# Patient Record
Sex: FEMALE | Race: WHITE | Employment: FULL TIME | URBAN - METROPOLITAN AREA
[De-identification: names, ages, dates, MRNs, and addresses within clinical notes are randomized per-mention and may not be internally consistent; named-entity substitution may affect disease eponyms.]

---

## 2018-04-19 PROCEDURE — 77030031139 HC SUT VCRL2 J&J -A

## 2018-04-19 PROCEDURE — 99283 EMERGENCY DEPT VISIT LOW MDM: CPT

## 2018-04-19 PROCEDURE — 75810000294 HC INTERM/LAYERED WND RPR

## 2018-04-19 PROCEDURE — 77030018836 HC SOL IRR NACL ICUM -A

## 2018-04-20 ENCOUNTER — HOSPITAL ENCOUNTER (EMERGENCY)
Age: 38
Discharge: HOME | End: 2018-04-20
Attending: EMERGENCY MEDICINE
Payer: SUBSIDIZED

## 2018-04-20 VITALS
HEIGHT: 62 IN | SYSTOLIC BLOOD PRESSURE: 107 MMHG | WEIGHT: 150 LBS | HEART RATE: 95 BPM | OXYGEN SATURATION: 100 % | TEMPERATURE: 97.4 F | DIASTOLIC BLOOD PRESSURE: 70 MMHG | BODY MASS INDEX: 27.6 KG/M2 | RESPIRATION RATE: 18 BRPM

## 2018-04-20 DIAGNOSIS — S01.81XA FACIAL LACERATION, INITIAL ENCOUNTER: Primary | ICD-10-CM

## 2018-04-20 PROCEDURE — 77030018836 HC SOL IRR NACL ICUM -A

## 2018-04-20 PROCEDURE — 90471 IMMUNIZATION ADMIN: CPT

## 2018-04-20 PROCEDURE — 74011250636 HC RX REV CODE- 250/636: Performed by: PHYSICIAN ASSISTANT

## 2018-04-20 PROCEDURE — 75810000294 HC INTERM/LAYERED WND RPR

## 2018-04-20 PROCEDURE — 90715 TDAP VACCINE 7 YRS/> IM: CPT | Performed by: PHYSICIAN ASSISTANT

## 2018-04-20 PROCEDURE — 74011000250 HC RX REV CODE- 250: Performed by: PHYSICIAN ASSISTANT

## 2018-04-20 PROCEDURE — 77030031139 HC SUT VCRL2 J&J -A

## 2018-04-20 RX ORDER — LIDOCAINE HYDROCHLORIDE AND EPINEPHRINE 10; 10 MG/ML; UG/ML
1.5 INJECTION, SOLUTION INFILTRATION; PERINEURAL ONCE
Status: COMPLETED | OUTPATIENT
Start: 2018-04-20 | End: 2018-04-20

## 2018-04-20 RX ORDER — BACITRACIN 500 UNIT/G
1 PACKET (EA) TOPICAL
Status: COMPLETED | OUTPATIENT
Start: 2018-04-20 | End: 2018-04-20

## 2018-04-20 RX ADMIN — BACITRACIN 1 PACKET: 500 OINTMENT TOPICAL at 00:35

## 2018-04-20 RX ADMIN — LIDOCAINE HYDROCHLORIDE,EPINEPHRINE BITARTRATE 15 MG: 10; .01 INJECTION, SOLUTION INFILTRATION; PERINEURAL at 00:34

## 2018-04-20 RX ADMIN — TETANUS TOXOID, REDUCED DIPHTHERIA TOXOID AND ACELLULAR PERTUSSIS VACCINE, ADSORBED 0.5 ML: 5; 2.5; 8; 8; 2.5 SUSPENSION INTRAMUSCULAR at 00:55

## 2018-04-20 NOTE — ED TRIAGE NOTES
Patient presents to the emergency department reporting laceration the right eyebrow. Patient struck the corner of a car door. Patient unsure of last tetanus shot. Bleeding is controlled with direct pressure and ice.

## 2018-04-20 NOTE — DISCHARGE INSTRUCTIONS
Cuts Closed With Stitches: Care Instructions  Your Care Instructions  A cut can happen anywhere on your body. The doctor used stitches to close the cut. Using stitches also helps the cut heal and reduces scarring. Sometimes pieces of tape called Steri-Strips are put over the stitches. If the cut went deep and through the skin, the doctor may have put in two layers of stitches. The deeper layer brings the deep part of the cut together. These stitches will dissolve and don't need to be removed. The stitches in the upper layer are the ones you see on the cut. You will probably have a bandage over the stitches. You will need to have the stitches removed, usually in 7 to 14 days. The doctor has checked you carefully, but problems can develop later. If you notice any problems or new symptoms, get medical treatment right away. Follow-up care is a key part of your treatment and safety. Be sure to make and go to all appointments, and call your doctor if you are having problems. It's also a good idea to know your test results and keep a list of the medicines you take. How can you care for yourself at home? · Keep the cut dry for the first 24 to 48 hours. After this, you can shower if your doctor okays it. Pat the cut dry. · Don't soak the cut, such as in a bathtub. Your doctor will tell you when it's safe to get the cut wet. · If your doctor told you how to care for your cut, follow your doctor's instructions. If you did not get instructions, follow this general advice:  ¨ After the first 24 to 48 hours, wash around the cut with clean water 2 times a day. Don't use hydrogen peroxide or alcohol, which can slow healing. ¨ You may cover the cut with a thin layer of petroleum jelly, such as Vaseline, and a nonstick bandage. ¨ Apply more petroleum jelly and replace the bandage as needed. · Prop up the sore area on a pillow anytime you sit or lie down during the next 3 days.  Try to keep it above the level of your heart. This will help reduce swelling. · Avoid any activity that could cause your cut to reopen. · Do not remove the stitches on your own. Your doctor will tell you when to come back to have the stitches removed. · Leave Steri-Strips on until they fall off. · Be safe with medicines. Read and follow all instructions on the label. ¨ If the doctor gave you a prescription medicine for pain, take it as prescribed. ¨ If you are not taking a prescription pain medicine, ask your doctor if you can take an over-the-counter medicine. When should you call for help? Call your doctor now or seek immediate medical care if:  ? · You have new pain, or your pain gets worse. ? · The skin near the cut is cold or pale or changes color. ? · You have tingling, weakness, or numbness near the cut.   ? · The cut starts to bleed, and blood soaks through the bandage. Oozing small amounts of blood is normal.   ? · You have trouble moving the area near the cut.   ? · You have symptoms of infection, such as:  ¨ Increased pain, swelling, warmth, or redness around the cut. ¨ Red streaks leading from the cut. ¨ Pus draining from the cut. ¨ A fever. ? Watch closely for changes in your health, and be sure to contact your doctor if:  ? · The cut reopens. ? · You do not get better as expected. Where can you learn more? Go to http://breanna-bakari.info/. Enter R217 in the search box to learn more about \"Cuts Closed With Stitches: Care Instructions. \"  Current as of: March 20, 2017  Content Version: 11.4  © 2182-7512 "[employer]. Care instructions adapted under license by [facility] (which disclaims liability or warranty for this information). If you have questions about a medical condition or this instruction, always ask your healthcare professional. [address] any warranty or liability for your use of this information.

## 2018-04-20 NOTE — ED PROVIDER NOTES
HPI Comments: 41 yo female with no significant PMH Here for evaluation of laceration to right eyebrow. States she opened car door and misjudged it causing metal edge to cut eyebrow. Bleeding controlled. No additional symptoms. TD Unsure. Patient is a 40 y.o. female presenting with skin laceration. The history is provided by the patient. Laceration    The incident occurred less than 1 hour ago. The laceration is located on the face. The laceration is 3 cm in size. The injury mechanism is a metal edge. Foreign body present: no. The pain is at a severity of 3/10. The pain has been constant since onset. It is unknown when the patient last had a tetanus shot. History reviewed. No pertinent past medical history. History reviewed. No pertinent surgical history. History reviewed. No pertinent family history. Social History     Social History    Marital status:      Spouse name: N/A    Number of children: N/A    Years of education: N/A     Occupational History    Not on file. Social History Main Topics    Smoking status: Never Smoker    Smokeless tobacco: Never Used    Alcohol use Not on file    Drug use: Not on file    Sexual activity: Not on file     Other Topics Concern    Not on file     Social History Narrative    No narrative on file         ALLERGIES: Pcn [penicillins]    Review of Systems   Constitutional: Negative for activity change and fever. HENT: Negative for facial swelling. Eyes: Negative for discharge. Respiratory: Negative for cough. Cardiovascular: Negative for leg swelling. Gastrointestinal: Negative for abdominal distention. Skin: Positive for wound. Negative for color change. Neurological: Negative for seizures and syncope. Psychiatric/Behavioral: Negative for behavioral problems. The patient is nervous/anxious.         Vitals:    04/20/18 0000   BP: 107/70   Pulse: 95   Resp: 18   Temp: 97.4 °F (36.3 °C)   SpO2: 100%   Weight: 68 kg (150 lb)   Height: 5' 2\" (1.575 m)            Physical Exam   Constitutional: She is oriented to person, place, and time. She appears well-nourished. No distress. HENT:   Head: Normocephalic and atraumatic. Eyes: Pupils are equal, round, and reactive to light. Neck: Normal range of motion. Cardiovascular: Normal rate and regular rhythm. Pulmonary/Chest: Effort normal and breath sounds normal.   Abdominal: Soft. There is no tenderness. Musculoskeletal: Normal range of motion. She exhibits no edema or tenderness. Neurological: She is alert and oriented to person, place, and time. Skin: Skin is warm and dry. Psychiatric: Her mood appears anxious. Nursing note and vitals reviewed. MDM  Number of Diagnoses or Management Options  Facial laceration, initial encounter:      Amount and/or Complexity of Data Reviewed  Discuss the patient with other providers: yes          ED Course       Wound Repair  Date/Time: 4/20/2018 1:30 AM  Performed by: PAPreparation: skin prepped with Betadine, skin prepped with Shur-Clens and sterile field established  Pre-procedure re-eval: Immediately prior to the procedure, the patient was reevaluated and found suitable for the planned procedure and any planned medications. Location details: face  Wound length:2.5 cm or less    Anesthesia:  Local Anesthetic: lidocaine 1% with epinephrine  Anesthetic total: 3 mL  Foreign bodies: no foreign bodies  Irrigation solution: saline  Irrigation method: syringe  Debridement: none  Subcutaneous closure: Vicryl (5-0 #2)  Fascia closure: Vicryl (5-0 rapide #6)  Number of sutures: 8  Technique: simple  Approximation: close  Dressing: antibiotic ointment  Patient tolerance: Patient tolerated the procedure well with no immediate complications  My total time at bedside, performing this procedure was 16-30 minutes. Patient has been reassessed. Feeling much better. Ready to discharge home.       Discussed case with attending Physician. Agrees with care and will D/C with follow up. Patient's results have been reviewed with them. Patient and/or family have verbally conveyed their understanding and agreement of the patient's signs, symptoms, diagnosis, treatment and prognosis and additionally agree to follow up as recommended or return to the Emergency Room should their condition change prior to follow-up. Discharge instructions have also been provided to the patient with some educational information regarding their diagnosis as well a list of reasons why they would want to return to the ER prior to their follow-up appointment should their condition change.   RACHELE Nelson